# Patient Record
(demographics unavailable — no encounter records)

---

## 2025-04-14 NOTE — HISTORY OF PRESENT ILLNESS
[de-identified] : This patient presents today for follow-up regarding right shoulder impingement syndrome.  We did obtain an MRI to rule out rotator cuff tear due to continued symptoms despite conservative treatment.  He presents today to review the MRI results.  Continues to have significant pain about the shoulder which is 7 out of 10 pain notes stiffness.  Notes weakness.  Pain worse with pulling pushing and lifting.  Pain is improved with rest.

## 2025-04-14 NOTE — PHYSICAL EXAM
[de-identified] : The patient appears well nourished  and in no apparent distress.  The patient is alert and oriented to person, place, and time.   Affect and mood appear normal.    The head is normocephalic and atraumatic.  The eyes reveal normal sclera and extra ocular muscles are intact.   The neck appears normal with no jugular venous distention or masses noted.   Skin shows normal turgor with no evidence of eczema or psoriasis.  No respiratory distress noted.  The patient ambulates with a normal gait.  The right shoulder has moderate loss of range of motion.  Severe pain noted with range of motion.  Positive impingement sign and positive Jimenez sign.  Tenderness about the glenohumeral joint and the rotator cuff insertion.  Ecchymosis present about the shoulder and the upper arm.  Rotator cuff strength shows significant weakness..   There is no soft tissue swelling.  There is no eccyhmosis.  There is no warmth or erythema.    There is no instabililty on exam to anterior drawer or load and shift.  No lymphadenopathy or edema is noted.  Pulses and capillary refill are normal.  There is no muscular atrophy.  Strength and sensation are intact distally.   [de-identified] : The MRI was reviewed with the patient.  The MRI shows full-thickness tear of the rotator cuff with retraction.  There is also a high riding humeral head.

## 2025-04-14 NOTE — REASON FOR VISIT
[Follow-Up Visit] : a follow-up visit for [[email or web address]] : Impingement syndrome of right shoulder.

## 2025-04-14 NOTE — PHYSICAL EXAM
[de-identified] : The patient appears well nourished  and in no apparent distress.  The patient is alert and oriented to person, place, and time.   Affect and mood appear normal.    The head is normocephalic and atraumatic.  The eyes reveal normal sclera and extra ocular muscles are intact.   The neck appears normal with no jugular venous distention or masses noted.   Skin shows normal turgor with no evidence of eczema or psoriasis.  No respiratory distress noted.  The patient ambulates with a normal gait.  The right shoulder has moderate loss of range of motion.  Severe pain noted with range of motion.  Positive impingement sign and positive Jimenez sign.  Tenderness about the glenohumeral joint and the rotator cuff insertion.  Ecchymosis present about the shoulder and the upper arm.  Rotator cuff strength shows significant weakness..   There is no soft tissue swelling.  There is no eccyhmosis.  There is no warmth or erythema.    There is no instabililty on exam to anterior drawer or load and shift.  No lymphadenopathy or edema is noted.  Pulses and capillary refill are normal.  There is no muscular atrophy.  Strength and sensation are intact distally.   [de-identified] : The MRI was reviewed with the patient.  The MRI shows full-thickness tear of the rotator cuff with retraction.  There is also a high riding humeral head.

## 2025-04-14 NOTE — HISTORY OF PRESENT ILLNESS
[de-identified] : This patient presents today for follow-up regarding right shoulder impingement syndrome.  We did obtain an MRI to rule out rotator cuff tear due to continued symptoms despite conservative treatment.  He presents today to review the MRI results.  Continues to have significant pain about the shoulder which is 7 out of 10 pain notes stiffness.  Notes weakness.  Pain worse with pulling pushing and lifting.  Pain is improved with rest.

## 2025-04-14 NOTE — DISCUSSION/SUMMARY
[de-identified] : Patient presents today for follow-up regarding right shoulder impingement syndrome.  Patient is here to review the MRI results which shows evidence of a complete full-thickness tear of the rotator cuff with retraction.  There is also a high riding numeral head.  I discussed the MRI findings with the patient at length.  I explained to him that this is not a repairable rotator cuff tear.  I explained to him that he will likely continue to pain due to disability from the chronic rotator cuff tear.  I do think he would benefit from a reverse total shoulder replacement.  We discussed the procedure with the patient at length.  Patient would like to think about the surgery as her symptoms are not that severe at the present time.  He will get back to us if he wishes to proceed with surgery sometime in the future.  At least 20 minutes was spent performing the evaluation and management on today's office visit.  This includes but is not limited to preparing to see patient including review of any test results or outside medical records, obtaining and/or reviewing separately obtained history, performing examination and evaluation, counseling and educating the patient on their diagnosis and treatment recommendations, ordering medications, tests, or procedures, documenting clinical information in the electronic health record, independently interpreting results (not separately reported) and communicating results to the patient, and coordination of care.

## 2025-04-14 NOTE — DISCUSSION/SUMMARY
[de-identified] : Patient presents today for follow-up regarding right shoulder impingement syndrome.  Patient is here to review the MRI results which shows evidence of a complete full-thickness tear of the rotator cuff with retraction.  There is also a high riding numeral head.  I discussed the MRI findings with the patient at length.  I explained to him that this is not a repairable rotator cuff tear.  I explained to him that he will likely continue to pain due to disability from the chronic rotator cuff tear.  I do think he would benefit from a reverse total shoulder replacement.  We discussed the procedure with the patient at length.  Patient would like to think about the surgery as her symptoms are not that severe at the present time.  He will get back to us if he wishes to proceed with surgery sometime in the future.  At least 20 minutes was spent performing the evaluation and management on today's office visit.  This includes but is not limited to preparing to see patient including review of any test results or outside medical records, obtaining and/or reviewing separately obtained history, performing examination and evaluation, counseling and educating the patient on their diagnosis and treatment recommendations, ordering medications, tests, or procedures, documenting clinical information in the electronic health record, independently interpreting results (not separately reported) and communicating results to the patient, and coordination of care.